# Patient Record
(demographics unavailable — no encounter records)

---

## 2024-10-24 NOTE — HISTORY OF PRESENT ILLNESS
[de-identified] : CC: nasal polyps   HISTORY OF PRESENT ILLNESS: Mr. Oneill is a pleasant 65 year old gentleman with nasal polyps previously seen by Dr. Leal who identified the patient with nasal polyps and asthma he has had a pred30 taper and augmentin. he did not tolerate augmentin well and switched to doxycycline culture grew pan sensitive strep pneumo, staph epidermidis and cutibacterium acnes he has significant nasal obstruction, PND and loss of sense of smell he uses albuterol as needed for his asthma exacerbations his main issue is his lose of smell in 2019 until he had another covid infection in 11/2023 and lost his sense of smell appears to had a cortisone shot in the past with improvement pred30 has not helped him  Environmental Allergies: not tested Immunotherapy: no Smoker: no Asthma: yes ASA sensitivity: no History of Autoimmune Disease: No History of Immune Deficiency: No   Key Elements at least 4 described: Location: bilateral Severity: severe Quality: obstruction Timing: constant Duration: years Modifying Factors: none Associated signs and symptoms: see above   s/p CT sinus official read pending, essentially right DNS, pansinusitis  3 mo f/u  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 has some congestion. has been using NSI w/ budesonide BID. ran out of budesonide 1 week ago  REVIEW OF SYSTEMS: General ROS: negative for - chills, fatigue or fever Psychological ROS: negative for - anxiety or depression Ophthalmic ROS: negative for - blurry vision, decreased vision or double vision ENT ROS: negative except as noted from HPI Allergy and Immunology ROS: negative except as noted from HPI Hematological and Lymphatic ROS: negative for - bleeding problems Endocrine ROS: negative for - malaise/lethargy Respiratory ROS: negative for - stridor Cardiovascular ROS: negative for - chest pain Gastrointestinal ROS: negative for - appetite loss or nausea/vomiting Genitourinary ROS: negative for - incontinence Musculoskeletal ROS: negative for - gait disturbance Neurological ROS: negative for - behavioral changes Dermatological ROS: negative for - nail changes   Physical Exam:   GENERAL APPEARANCE: Well-developed and No Acute Distress. COMMUNICATION: Able to Communicate. Strong Voice.   HEAD AND FACE Eyes: Testing of ocular motility including primary gaze alignment normal. Inspection and Appearance: No evidence of lesions or masses Palpation: Palpation of the face reveals no sinus tenderness Salivary Glands: Symmetric without masses Facial Strength: Symmetric without evidence of facial paralysis   EAR, NOSE, MOUTH, and THROAT: Ear Canals and Tympanic Membranes, Bilateral: No evidence of inflammation or lesions. Thresholds: Clinical speech reception thresholds normal. External, Nose and Auricle: No lesions or masses. Nasal, Mucosa, Septum, and Turbinates: See endoscopy.   NECK: Evaluation: No evidence of masses or crepitus. The neck is symmetric and the trachea is in the midline position. Thyroid: No evidence of enlargement, tenderness or mass. Neck Lymph Nodes: WNL. Respiratory: Inspection of the chest including symmetry, expansion and/or assessment of respiratory effort normal. Cardiovascular: Evaluation of peripheral vascular system by observation and palpation of capillary refill, normal. Neurological/Psychiatric: Alert, Oriented, Mood, and Affect Normal.   IMAGING:   previous visit PROCEDURE: Nasal endoscopy (38029)   SURGEON: Tramaine Benitez MD   Prior to the procedure, I had a discussion with the patient regarding the risks, benefits, and alternatives of the procedure and a verbal consent was obtained.   After obtaining adequate decongestion of the nasal mucosa with topical Epinephrine and adequate anesthesia with topical Lidocaine nasal spray, the nasal endoscope was used to examine the nasal passages and paranasal sinuses. The endoscope was passed along the floor of the nose bilaterally to evaluate the inferior meatus, floor of the nose, inferior turbinate, and nasopharynx. The scope was then passed superiorly to evaluate the area of the sphenoethmoidal recess, superior turbinate and superior meatus. As the scope was withdrawn anteriorly, the middle turbinate and middle meatus were carefully inspected. The endoscope was withdrawn and the patient tolerated the procedure well. No complications were encountered.   INSTRUMENTS:  rigid 45   EXAM FINDINGS: residual edema in the frontal recess, no gross polyps. all operated sinuses widely patent, septum midline,    IMPRESSION:  Mr. 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

## 2025-02-06 NOTE — HISTORY OF PRESENT ILLNESS
[de-identified] : CC: nasal polyps   HISTORY OF PRESENT ILLNESS: Mr. Oneill is a pleasant 65 year old gentleman with nasal polyps previously seen by Dr. Leal who identified the patient with nasal polyps and asthma he has had a pred30 taper and augmentin. he did not tolerate augmentin well and switched to doxycycline culture grew pan sensitive strep pneumo, staph epidermidis and cutibacterium acnes he has significant nasal obstruction, PND and loss of sense of smell he uses albuterol as needed for his asthma exacerbations his main issue is his lose of smell in 2019 until he had another covid infection in 11/2023 and lost his sense of smell appears to had a cortisone shot in the past with improvement pred30 has not helped him  Environmental Allergies: not tested Immunotherapy: no Smoker: no Asthma: yes ASA sensitivity: no History of Autoimmune Disease: No History of Immune Deficiency: No   Key Elements at least 4 described: Location: bilateral Severity: severe Quality: obstruction Timing: constant Duration: years Modifying Factors: none Associated signs and symptoms: see above   s/p CT sinus official read pending, essentially right DNS, pansinusitis  3 mo f/u  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 has some congestion. has been using NSI w/ budesonide BID. ran out of budesonide 1 week ago  6 mo f/u s/p bFESS septolasty on 7/31/2024 using budesonide daily feels well, nothing comes out with the irrigations  REVIEW OF SYSTEMS: General ROS: negative for - chills, fatigue or fever Psychological ROS: negative for - anxiety or depression Ophthalmic ROS: negative for - blurry vision, decreased vision or double vision ENT ROS: negative except as noted from HPI Allergy and Immunology ROS: negative except as noted from HPI Hematological and Lymphatic ROS: negative for - bleeding problems Endocrine ROS: negative for - malaise/lethargy Respiratory ROS: negative for - stridor Cardiovascular ROS: negative for - chest pain Gastrointestinal ROS: negative for - appetite loss or nausea/vomiting Genitourinary ROS: negative for - incontinence Musculoskeletal ROS: negative for - gait disturbance Neurological ROS: negative for - behavioral changes Dermatological ROS: negative for - nail changes   Physical Exam:   GENERAL APPEARANCE: Well-developed and No Acute Distress. COMMUNICATION: Able to Communicate. Strong Voice.   HEAD AND FACE Eyes: Testing of ocular motility including primary gaze alignment normal. Inspection and Appearance: No evidence of lesions or masses Palpation: Palpation of the face reveals no sinus tenderness Salivary Glands: Symmetric without masses Facial Strength: Symmetric without evidence of facial paralysis   EAR, NOSE, MOUTH, and THROAT: Ear Canals and Tympanic Membranes, Bilateral: No evidence of inflammation or lesions. Thresholds: Clinical speech reception thresholds normal. External, Nose and Auricle: No lesions or masses. Nasal, Mucosa, Septum, and Turbinates: See endoscopy.   NECK: Evaluation: No evidence of masses or crepitus. The neck is symmetric and the trachea is in the midline position. Thyroid: No evidence of enlargement, tenderness or mass. Neck Lymph Nodes: WNL. Respiratory: Inspection of the chest including symmetry, expansion and/or assessment of respiratory effort normal. Cardiovascular: Evaluation of peripheral vascular system by observation and palpation of capillary refill, normal. Neurological/Psychiatric: Alert, Oriented, Mood, and Affect Normal.   IMAGING:   PROCEDURE: Nasal endoscopy ([id number])   SURGEON: Tramaine Benitez MD   Prior to the procedure, I had a discussion with the patient regarding the risks, benefits, and alternatives of the procedure and a verbal consent was obtained.   After obtaining adequate decongestion of the nasal mucosa with topical Epinephrine and adequate anesthesia with topical Lidocaine nasal spray, the nasal endoscope was used to examine the nasal passages and paranasal sinuses. The endoscope was passed along the floor of the nose bilaterally to evaluate the inferior meatus, floor of the nose, inferior turbinate, and nasopharynx. The scope was then passed superiorly to evaluate the area of the sphenoethmoidal recess, superior turbinate and superior meatus. As the scope was withdrawn anteriorly, the middle turbinate and middle meatus were carefully inspected. The endoscope was withdrawn and the patient tolerated the procedure well. No complications were encountered.   INSTRUMENTS:  rigid 45   EXAM FINDINGS: residual edema in the frontal recess, no gross polyps. able to pass suction. all operated sinuses widely patent, septum midline. no polyps.   IMPRESSION:  Mr. 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

## 2025-02-06 NOTE — HISTORY OF PRESENT ILLNESS
[de-identified] : CC: nasal polyps   HISTORY OF PRESENT ILLNESS: Mr. Oneill is a pleasant 65 year old gentleman with nasal polyps previously seen by Dr. Leal who identified the patient with nasal polyps and asthma he has had a pred30 taper and augmentin. he did not tolerate augmentin well and switched to doxycycline culture grew pan sensitive strep pneumo, staph epidermidis and cutibacterium acnes he has significant nasal obstruction, PND and loss of sense of smell he uses albuterol as needed for his asthma exacerbations his main issue is his lose of smell in 2019 until he had another covid infection in 11/2023 and lost his sense of smell appears to had a cortisone shot in the past with improvement pred30 has not helped him  Environmental Allergies: not tested Immunotherapy: no Smoker: no Asthma: yes ASA sensitivity: no History of Autoimmune Disease: No History of Immune Deficiency: No   Key Elements at least 4 described: Location: bilateral Severity: severe Quality: obstruction Timing: constant Duration: years Modifying Factors: none Associated signs and symptoms: see above   s/p CT sinus official read pending, essentially right DNS, pansinusitis  3 mo f/u  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 has some congestion. has been using NSI w/ budesonide BID. ran out of budesonide 1 week ago  6 mo f/u s/p bFESS septolasty on 7/31/2024 using budesonide daily feels well, nothing comes out with the irrigations  REVIEW OF SYSTEMS: General ROS: negative for - chills, fatigue or fever Psychological ROS: negative for - anxiety or depression Ophthalmic ROS: negative for - blurry vision, decreased vision or double vision ENT ROS: negative except as noted from HPI Allergy and Immunology ROS: negative except as noted from HPI Hematological and Lymphatic ROS: negative for - bleeding problems Endocrine ROS: negative for - malaise/lethargy Respiratory ROS: negative for - stridor Cardiovascular ROS: negative for - chest pain Gastrointestinal ROS: negative for - appetite loss or nausea/vomiting Genitourinary ROS: negative for - incontinence Musculoskeletal ROS: negative for - gait disturbance Neurological ROS: negative for - behavioral changes Dermatological ROS: negative for - nail changes   Physical Exam:   GENERAL APPEARANCE: Well-developed and No Acute Distress. COMMUNICATION: Able to Communicate. Strong Voice.   HEAD AND FACE Eyes: Testing of ocular motility including primary gaze alignment normal. Inspection and Appearance: No evidence of lesions or masses Palpation: Palpation of the face reveals no sinus tenderness Salivary Glands: Symmetric without masses Facial Strength: Symmetric without evidence of facial paralysis   EAR, NOSE, MOUTH, and THROAT: Ear Canals and Tympanic Membranes, Bilateral: No evidence of inflammation or lesions. Thresholds: Clinical speech reception thresholds normal. External, Nose and Auricle: No lesions or masses. Nasal, Mucosa, Septum, and Turbinates: See endoscopy.   NECK: Evaluation: No evidence of masses or crepitus. The neck is symmetric and the trachea is in the midline position. Thyroid: No evidence of enlargement, tenderness or mass. Neck Lymph Nodes: WNL. Respiratory: Inspection of the chest including symmetry, expansion and/or assessment of respiratory effort normal. Cardiovascular: Evaluation of peripheral vascular system by observation and palpation of capillary refill, normal. Neurological/Psychiatric: Alert, Oriented, Mood, and Affect Normal.   IMAGING:   PROCEDURE: Nasal endoscopy ([id number])   SURGEON: Tramaine Benitez MD   Prior to the procedure, I had a discussion with the patient regarding the risks, benefits, and alternatives of the procedure and a verbal consent was obtained.   After obtaining adequate decongestion of the nasal mucosa with topical Epinephrine and adequate anesthesia with topical Lidocaine nasal spray, the nasal endoscope was used to examine the nasal passages and paranasal sinuses. The endoscope was passed along the floor of the nose bilaterally to evaluate the inferior meatus, floor of the nose, inferior turbinate, and nasopharynx. The scope was then passed superiorly to evaluate the area of the sphenoethmoidal recess, superior turbinate and superior meatus. As the scope was withdrawn anteriorly, the middle turbinate and middle meatus were carefully inspected. The endoscope was withdrawn and the patient tolerated the procedure well. No complications were encountered.   INSTRUMENTS:  rigid 45   EXAM FINDINGS: residual edema in the frontal recess, no gross polyps. able to pass suction. all operated sinuses widely patent, septum midline. no polyps.   IMPRESSION:  Mr. 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

## 2025-05-09 NOTE — HISTORY OF PRESENT ILLNESS
[de-identified] : CC: nasal polyps   HISTORY OF PRESENT ILLNESS: Mr. Oneill is a pleasant 65 year old gentleman with nasal polyps previously seen by Dr. Leal who identified the patient with nasal polyps and asthma he has had a pred30 taper and augmentin. he did not tolerate augmentin well and switched to doxycycline culture grew pan sensitive strep pneumo, staph epidermidis and cutibacterium acnes he has significant nasal obstruction, PND and loss of sense of smell he uses albuterol as needed for his asthma exacerbations his main issue is his lose of smell in 2019 until he had another covid infection in 11/2023 and lost his sense of smell appears to had a cortisone shot in the past with improvement pred30 has not helped him  Environmental Allergies: not tested Immunotherapy: no Smoker: no Asthma: yes ASA sensitivity: no History of Autoimmune Disease: No History of Immune Deficiency: No   Key Elements at least 4 described: Location: bilateral Severity: severe Quality: obstruction Timing: constant Duration: years Modifying Factors: none Associated signs and symptoms: see above   s/p CT sinus official read pending, essentially right DNS, pansinusitis  3 mo f/u  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 has some congestion. has been using NSI w/ budesonide BID. ran out of budesonide 1 week ago  6 mo f/u s/p bFESS septolasty on 7/31/2024 using budesonide daily feels well, nothing comes out with the irrigations  3 month follow up, on NSI only some allergy symptoms, wheezing, using albuterol daily ears feel clogged  REVIEW OF SYSTEMS: General ROS: negative for - chills, fatigue or fever Psychological ROS: negative for - anxiety or depression Ophthalmic ROS: negative for - blurry vision, decreased vision or double vision ENT ROS: negative except as noted from HPI Allergy and Immunology ROS: negative except as noted from HPI Hematological and Lymphatic ROS: negative for - bleeding problems Endocrine ROS: negative for - malaise/lethargy Respiratory ROS: negative for - stridor Cardiovascular ROS: negative for - chest pain Gastrointestinal ROS: negative for - appetite loss or nausea/vomiting Genitourinary ROS: negative for - incontinence Musculoskeletal ROS: negative for - gait disturbance Neurological ROS: negative for - behavioral changes Dermatological ROS: negative for - nail changes   Physical Exam:   GENERAL APPEARANCE: Well-developed and No Acute Distress. COMMUNICATION: Able to Communicate. Strong Voice.   HEAD AND FACE Eyes: Testing of ocular motility including primary gaze alignment normal. Inspection and Appearance: No evidence of lesions or masses Palpation: Palpation of the face reveals no sinus tenderness Salivary Glands: Symmetric without masses Facial Strength: Symmetric without evidence of facial paralysis   EAR, NOSE, MOUTH, and THROAT: Ear Canals and Tympanic Membranes, Bilateral: No evidence of inflammation or lesions. Thresholds: Clinical speech reception thresholds normal. External, Nose and Auricle: No lesions or masses. Nasal, Mucosa, Septum, and Turbinates: See endoscopy.   NECK: Evaluation: No evidence of masses or crepitus. The neck is symmetric and the trachea is in the midline position. Thyroid: No evidence of enlargement, tenderness or mass. Neck Lymph Nodes: WNL. Respiratory: Inspection of the chest including symmetry, expansion and/or assessment of respiratory effort normal. Cardiovascular: Evaluation of peripheral vascular system by observation and palpation of capillary refill, normal. Neurological/Psychiatric: Alert, Oriented, Mood, and Affect Normal.   IMAGING:   PROCEDURE: Nasal endoscopy ([id number])   SURGEON: Tramaine Benitez MD   Prior to the procedure, I had a discussion with the patient regarding the risks, benefits, and alternatives of the procedure and a verbal consent was obtained.   After obtaining adequate decongestion of the nasal mucosa with topical Epinephrine and adequate anesthesia with topical Lidocaine nasal spray, the nasal endoscope was used to examine the nasal passages and paranasal sinuses. The endoscope was passed along the floor of the nose bilaterally to evaluate the inferior meatus, floor of the nose, inferior turbinate, and nasopharynx. The scope was then passed superiorly to evaluate the area of the sphenoethmoidal recess, superior turbinate and superior meatus. As the scope was withdrawn anteriorly, the middle turbinate and middle meatus were carefully inspected. The endoscope was withdrawn and the patient tolerated the procedure well. No complications were encountered.   INSTRUMENTS:  rigid 45   EXAM FINDINGS: very mild edema throughout, clear mucus. all operated sinuses widely patent   PROCEDURE: Removal impacted cerumen requiring instrumentation. ([id number])   PREOPERATIVE DIAGNOSIS: Cerumen Impaction   POSTOPERATIVE DIAGNOSIS Dx: Same   INDICATION FOR PROCEDURE: Patient has noted fullness and hearing loss in the affected ears for significant period of time.   EXAM FINDINGS: Auditory canal(s) was obstructed with cerumen.  Cerumen was observed with the microscope after the ear speculum was placed in the EAC, and gently loosened with the cerumen loop circumferentially.  The cerumen was then removed using suction, cerumen loop, and irrigation.  The tympanic membranes are intact following the procedure.  Auditory canals appear minimally inflamed.   Left ear:  CI removed TMI Right ear: CI removed TMI     IMPRESSION:  Mr. 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 if he continues to have allergic symptoms we will refer to helen Benitez MD [facility] Rhinology and Skull Base Surgery Department of Otolaryngology- Head and Neck Surgery [facility]